# Patient Record
Sex: FEMALE | Race: OTHER | HISPANIC OR LATINO | URBAN - METROPOLITAN AREA
[De-identification: names, ages, dates, MRNs, and addresses within clinical notes are randomized per-mention and may not be internally consistent; named-entity substitution may affect disease eponyms.]

---

## 2023-10-07 ENCOUNTER — APPOINTMENT (OUTPATIENT)
Dept: LAB | Age: 43
End: 2023-10-07

## 2023-10-07 ENCOUNTER — OCCMED (OUTPATIENT)
Dept: URGENT CARE | Age: 43
End: 2023-10-07

## 2023-10-07 DIAGNOSIS — Z02.1 PHYSICAL EXAM, PRE-EMPLOYMENT: ICD-10-CM

## 2023-10-07 DIAGNOSIS — Z00.00 NORMAL EXAM: Primary | ICD-10-CM

## 2023-10-07 PROCEDURE — 86480 TB TEST CELL IMMUN MEASURE: CPT

## 2023-10-07 PROCEDURE — 36415 COLL VENOUS BLD VENIPUNCTURE: CPT

## 2023-10-07 NOTE — PROGRESS NOTES
This encounter was created for [facility] orders only . [facility]        NAME: Serena Camacho is a 39 y.o. female  : 1980    MRN: [id number]  DATE: 2023  TIME: 11:11 AM    There were no vitals taken for this visit. Assessment and Plan   No primary diagnosis found. No diagnosis found. Patient Instructions       Follow up with PCP in 3-5 days. Proceed to  ER if symptoms worsen. Chief Complaint   No chief complaint on file. History of Present Illness       HPI    Review of Systems   Review of Systems      Current Medications     No current outpatient medications on file. Current Allergies     Allergies as of 10/07/2023   • (Not on File)            The following portions of the patient's history were reviewed and updated as appropriate: allergies, current medications, past family history, past medical history, past social history, past surgical history and problem list.     No past medical history on file. No past surgical history on file. No family history on file. Medications have been verified. Objective   There were no vitals taken for this visit.        Physical Exam     Physical Exam

## 2023-10-08 LAB
GAMMA INTERFERON BACKGROUND BLD IA-ACNC: 0.06 IU/ML
M TB IFN-G BLD-IMP: NEGATIVE
M TB IFN-G CD4+ BCKGRND COR BLD-ACNC: 0.02 IU/ML
M TB IFN-G CD4+ BCKGRND COR BLD-ACNC: 0.05 IU/ML
MITOGEN IGNF BCKGRD COR BLD-ACNC: 9.94 IU/ML

## 2024-01-29 ENCOUNTER — OFFICE VISIT (OUTPATIENT)
Dept: OBGYN CLINIC | Facility: CLINIC | Age: 44
End: 2024-01-29

## 2024-01-29 VITALS
SYSTOLIC BLOOD PRESSURE: 115 MMHG | HEART RATE: 88 BPM | BODY MASS INDEX: 29.25 KG/M2 | DIASTOLIC BLOOD PRESSURE: 80 MMHG | WEIGHT: 182 LBS | HEIGHT: 66 IN

## 2024-01-29 DIAGNOSIS — Z01.419 WOMEN'S ANNUAL ROUTINE GYNECOLOGICAL EXAMINATION: Primary | ICD-10-CM

## 2024-01-29 DIAGNOSIS — Z12.4 CERVICAL CANCER SCREENING: ICD-10-CM

## 2024-01-29 DIAGNOSIS — Z12.31 ENCOUNTER FOR SCREENING MAMMOGRAM FOR MALIGNANT NEOPLASM OF BREAST: ICD-10-CM

## 2024-01-29 DIAGNOSIS — Z11.3 SCREENING FOR STDS (SEXUALLY TRANSMITTED DISEASES): ICD-10-CM

## 2024-01-29 PROCEDURE — 99386 PREV VISIT NEW AGE 40-64: CPT | Performed by: NURSE PRACTITIONER

## 2024-01-29 NOTE — PROGRESS NOTES
"ANNUAL GYNECOLOGICAL EXAMINATION    Talia Shaw is a 43 y.o. female who presents today for annual GYN exam.  Her last pap smear was performed a few years ago and result was normal per patient.  She reports no history of abnormal pap smears in her past.  Her last mammogram was performed a few years ago and result was normal per patient. She has not had a recent HIV screening performed. She reports menses as regular.  Patient's last menstrual period was 01/15/2024 (exact date).  Her general medical history has been reviewed and she reports it as follows:    History reviewed. No pertinent past medical history.  Past Surgical History:   Procedure Laterality Date     SECTION       SECTION       SECTION      TUBAL LIGATION       OB History          3    Para   3    Term   3            AB        Living   3         SAB        IAB        Ectopic        Multiple        Live Births                   Social History     Tobacco Use    Smoking status: Never    Smokeless tobacco: Never   Substance Use Topics    Alcohol use: Never    Drug use: Never     Social History     Substance and Sexual Activity   Sexual Activity Yes    Partners: Male    Birth control/protection: Female Sterilization, None     Cancer-related family history includes Prostate cancer in her father; Uterine cancer in her mother and paternal grandmother. There is no history of Breast cancer, Colon cancer, or Ovarian cancer.    No current outpatient medications    Review of Systems:  Review of Systems   Constitutional: Negative.    Gastrointestinal: Negative.    Genitourinary: Negative.    Skin: Negative.        Physical Exam:  /80 (BP Location: Right arm, Patient Position: Sitting)   Pulse 88   Ht 5' 6\" (1.676 m)   Wt 82.6 kg (182 lb)   LMP 01/15/2024 (Exact Date)   BMI 29.38 kg/m²   Physical Exam  Constitutional:       General: She is not in acute distress.     Appearance: Normal appearance. "   Genitourinary:      Vulva and bladder normal.      No lesions in the vagina.      No vaginal erythema or ulceration.        Right Adnexa: not tender and no mass present.     Left Adnexa: not tender and no mass present.     No cervical motion tenderness or lesion.      Uterus is not enlarged or tender.      No uterine mass detected.  Breasts:     Right: No mass, nipple discharge or skin change.      Left: No mass, nipple discharge or skin change.   Cardiovascular:      Rate and Rhythm: Normal rate and regular rhythm.   Pulmonary:      Effort: Pulmonary effort is normal.      Breath sounds: Normal breath sounds.   Abdominal:      General: Abdomen is flat.      Palpations: Abdomen is soft.   Musculoskeletal:      Cervical back: Neck supple.   Neurological:      Mental Status: She is alert.   Skin:     General: Skin is warm and dry.   Psychiatric:         Mood and Affect: Mood normal.         Behavior: Behavior normal.   Vitals reviewed.       Assessment/Plan:   1. Normal well-woman GYN exam.  2. Cervical cancer screening:  Normal cervical exam.  Pap smear done with HPV co-testing.    3. STD screening:  Orders placed for vaginal GC/CT cultures. Declines serum anti-HIV, anti-HCV, HbsAg, syphilis panel.   4. Breast cancer screening:  Normal breast exam.  Order placed for bilateral screening mammogram.  Reviewed breast self-awareness.   5. Depression Screening: Patient's depression screening was assessed with a PHQ-2 score of 0. Their PHQ-9 score was 0. Clinically patient does not have depression. No treatment is required.     6. BMI Counseling: Body mass index is 29.38 kg/m². Discussed the patient's BMI with her. The BMI is below 30 and does not require intervention    7. Contraception:  permanent sterilization    8. Return to office in one year for annual exam or sooner if needed.    Reviewed with patient that [facility], but that they will not necessarily be reviewed by me  immediately.  Explained that I will review results at my earliest opportunity and contact patient appropriately.

## 2024-01-30 PROCEDURE — 87591 N.GONORRHOEAE DNA AMP PROB: CPT | Performed by: NURSE PRACTITIONER

## 2024-01-30 PROCEDURE — 88175 CYTOPATH C/V AUTO FLUID REDO: CPT | Performed by: NURSE PRACTITIONER

## 2024-01-30 PROCEDURE — 87624 HPV HI-RISK TYP POOLED RSLT: CPT | Performed by: NURSE PRACTITIONER

## 2024-01-30 PROCEDURE — 87491 CHLMYD TRACH DNA AMP PROBE: CPT | Performed by: NURSE PRACTITIONER

## 2024-01-31 ENCOUNTER — TELEPHONE (OUTPATIENT)
Dept: OTHER | Facility: OTHER | Age: 44
End: 2024-01-31

## 2024-01-31 LAB
HPV HR 12 DNA CVX QL NAA+PROBE: NEGATIVE
HPV16 DNA CVX QL NAA+PROBE: NEGATIVE
HPV18 DNA CVX QL NAA+PROBE: NEGATIVE

## 2024-01-31 NOTE — TELEPHONE ENCOUNTER
Talia Shaw  [unfilled]  [unfilled]  [phone]  No e-mail address on record  Ivorian  Ivorian  No primary care provider on file.     Mammogram Screening (Hospital locations only):[facility]  Pap smear screening (Clinic vs [facility][1]):  PCP (Clinic vs [facility][1]):       Transportation:     Education:    Patient scheduled for mammogram and educated on the importance of early detection and routine screenings.

## 2024-02-01 LAB
C TRACH DNA SPEC QL NAA+PROBE: NEGATIVE
N GONORRHOEA DNA SPEC QL NAA+PROBE: NEGATIVE

## 2024-02-05 LAB
LAB AP GYN PRIMARY INTERPRETATION: NORMAL
Lab: NORMAL

## 2024-02-06 NOTE — RESULT ENCOUNTER NOTE
Spoke with patient to confirm normal pap and neg sti screening. Patient stated understanding and had no further questions

## 2024-05-14 ENCOUNTER — TELEPHONE (OUTPATIENT)
Facility: HOSPITAL | Age: 44
End: 2024-05-14

## 2024-05-14 NOTE — TELEPHONE ENCOUNTER
Telephone Call    [] Called Patient regarding [employer]; Patient answered call and wants to enroll; Asked for a call back.    [] Called Patient regarding [employer] [facility]; Patient answered call; Declined to enroll in program.    [x] Called Patient regarding [employer] [facility]; Patient did not ; Left voicemail with my name and direct phone number.     [] Called Patient regarding [employer] [facility]; Patient did not ; Unable to leave voicemail.    [] Called Patient regarding [employer] [facility]; Phone number is invalid    Attempts (Max 3): [x]1   []2   []3        Additional Notes:

## 2024-05-21 ENCOUNTER — TELEPHONE (OUTPATIENT)
Facility: HOSPITAL | Age: 44
End: 2024-05-21

## 2024-05-21 NOTE — TELEPHONE ENCOUNTER
Telephone Call    [] Called Patient regarding [employer] [facility]; Patient answered call and wants to enroll; Asked for a call back.    [] Called Patient regarding [employer] [facility]; Patient answered call; Declined to enroll in program.    [x] Called Patient regarding [employer] [facility]; Patient did not ; Left voicemail with my name and direct phone number.     [] Called Patient regarding [employer] [facility]; Patient did not ; Unable to leave voicemail.    [] Called Patient regarding [employer] [facility]; Phone number is invalid    Attempts (Max 3): []1   [x]2   []3        Additional Notes:

## 2024-05-29 ENCOUNTER — HOSPITAL ENCOUNTER (OUTPATIENT)
Dept: MAMMOGRAPHY | Facility: HOSPITAL | Age: 44
Discharge: HOME | End: 2024-05-29
Payer: COMMERCIAL

## 2024-05-29 VITALS — WEIGHT: 182.1 LBS | BODY MASS INDEX: 29.27 KG/M2 | HEIGHT: 66 IN

## 2024-05-29 DIAGNOSIS — Z12.31 ENCOUNTER FOR SCREENING MAMMOGRAM FOR MALIGNANT NEOPLASM OF BREAST: ICD-10-CM

## 2024-05-29 PROCEDURE — 77067 SCR MAMMO BI INCL CAD: CPT

## 2024-05-29 PROCEDURE — 77063 BREAST TOMOSYNTHESIS BI: CPT

## 2024-06-03 ENCOUNTER — TELEPHONE (OUTPATIENT)
Facility: HOSPITAL | Age: 44
End: 2024-06-03

## 2024-06-03 NOTE — TELEPHONE ENCOUNTER
Telephone Call    [] Called Patient regarding [employer] [facility]; Patient answered call and DECLINED to enroll; She stated that she cannot apply for any government assistant. She was contact by the hospital financial dept where they explained to her that she would be fully covered under their assistant.     [] Called Patient regarding [employer] [facility]; Patient answered call; Declined to enroll in program.    [] Called Patient regarding [employer] [facility]; Patient did not ; Left voicemail with my name and direct phone number.     [] Called Patient regarding [employer] [facility]; Patient did not ; Unable to leave voicemail.    [] Called Patient regarding [employer] [facility]; Phone number is invalid    Attempts (Max 3): []1   []2   []3        Additional Notes:

## 2024-08-30 ENCOUNTER — OFFICE VISIT (OUTPATIENT)
Dept: OBGYN CLINIC | Facility: CLINIC | Age: 44
End: 2024-08-30

## 2024-08-30 VITALS
WEIGHT: 183 LBS | HEART RATE: 87 BPM | HEIGHT: 66 IN | DIASTOLIC BLOOD PRESSURE: 65 MMHG | SYSTOLIC BLOOD PRESSURE: 105 MMHG | BODY MASS INDEX: 29.41 KG/M2

## 2024-08-30 DIAGNOSIS — R04.0 EPISTAXIS: ICD-10-CM

## 2024-08-30 DIAGNOSIS — Z11.3 SCREEN FOR STD (SEXUALLY TRANSMITTED DISEASE): ICD-10-CM

## 2024-08-30 DIAGNOSIS — N93.9 ABNORMAL VAGINAL BLEEDING: Primary | ICD-10-CM

## 2024-08-30 LAB — SL AMB POCT URINE HCG: NEGATIVE

## 2024-08-30 PROCEDURE — 99213 OFFICE O/P EST LOW 20 MIN: CPT | Performed by: OBSTETRICS & GYNECOLOGY

## 2024-08-30 PROCEDURE — 87591 N.GONORRHOEAE DNA AMP PROB: CPT | Performed by: OBSTETRICS & GYNECOLOGY

## 2024-08-30 PROCEDURE — 87491 CHLMYD TRACH DNA AMP PROBE: CPT | Performed by: OBSTETRICS & GYNECOLOGY

## 2024-08-30 PROCEDURE — 81025 URINE PREGNANCY TEST: CPT | Performed by: OBSTETRICS & GYNECOLOGY

## 2024-08-30 NOTE — ASSESSMENT & PLAN NOTE
Patient endorses abnormal vaginal bleeding. She had 5 days of heavy bleeding 2 weeks apart. Currently complaining of spotting. Patient usually has regular monthly periods. Patient has red-brown discharge on speculum examination from cervical os. Risk factors for endometrial cancer include obesity. Differential diagnosis includes uterine fibroids, endometrial carcinoma vs perimenopausal changes.    Plan:  Urine pregnancy test in office today, negative  CBC, PT, PTT, INR ordered  Pelvic ultrasound ordered  Advised patient to keep menstrual diary  Chlamydia and gonorrhea collected today per patient request  Follow up in 3 months  Patient may require endometrial biopsy if no cause of abnormal bleeding identified

## 2024-08-30 NOTE — PROGRESS NOTES
Ambulatory Visit  Name: Talia Shaw      : 1980      MRN: [id number]  Encounter Provider: [facility]  Encounter Date: 2024   Encounter department: [facility] [place]    Assessment & Plan   1. Abnormal vaginal bleeding  Assessment & Plan:  Patient endorses abnormal vaginal bleeding. She had 5 days of heavy bleeding 2 weeks apart. Currently complaining of spotting. Patient usually has regular monthly periods. Patient has red-brown discharge on speculum examination from cervical os. Risk factors for endometrial cancer include obesity. Differential diagnosis includes uterine fibroids, endometrial carcinoma vs perimenopausal changes.    Plan:  Urine pregnancy test in office today  CBC, PT, PTT, INR ordered  Pelvic ultrasound ordered  Advised patient to keep menstrual diary  Chlamydia and gonorrhea collected today per patient request  Follow up in 3 months  Patient may require endometrial biopsy if no cause of abnormal bleeding identified  Orders:  -     CBC and Platelet; Future  -     Protime-INR; Future  -     APTT; Future  -     US pelvis complete non OB; Future; Expected date: 2024  -     Chlamydia/GC amplified DNA by PCR  -     POCT urine HCG  2. Epistaxis  -     CBC and Platelet; Future  -     Protime-INR; Future  -     APTT; Future  3. Screen for STD (sexually transmitted disease)  -     Chlamydia/GC amplified DNA by PCR      History of Present Illness     Talia Shaw is a 43 y.o. female who presents for irregular menses. Patient had a 5 day heavy period starting 8/3 and another 5 day heavy period two weeks later starting . This was very unusual for her. She also notes she had a nose bleeds during her second period this month. Also notes accompanying nausea and headaches. Patient is currently sexually active with 1 male partner. Denies any clotting or bleeding disorders. Endorses continued vaginal bleeding throughout pregnancies. Denies family  "history autoimmune diseases or hemophilia. Has a cousin with a blood clot in leg.   Patient reports she breast fed all 3 of her children, denies history of unopposed estrogen therapy. She repots she has always been overweight, usually between 160-180 lbs in her adult life.     Review of Systems   Constitutional:  Negative for chills and fever.   HENT:  Negative for ear pain and sore throat.    Eyes:  Negative for pain and visual disturbance.   Respiratory:  Negative for cough and shortness of breath.    Cardiovascular:  Negative for chest pain and palpitations.   Gastrointestinal:  Negative for abdominal pain and vomiting.   Genitourinary:  Positive for menstrual problem and vaginal bleeding. Negative for dysuria and hematuria.   Musculoskeletal:  Negative for arthralgias and back pain.   Skin:  Negative for color change and rash.   Neurological:  Negative for seizures and syncope.   All other systems reviewed and are negative.      Objective     /65 (BP Location: Right arm, Patient Position: Sitting)   Pulse 87   Ht 5' 6\" (1.676 m)   Wt 83 kg (183 lb)   LMP 08/19/2024 (Exact Date)   BMI 29.54 kg/m²     Physical Exam  Constitutional:       Appearance: Normal appearance.   Cardiovascular:      Rate and Rhythm: Normal rate and regular rhythm.      Pulses: Normal pulses.      Heart sounds: Normal heart sounds.   Pulmonary:      Effort: Pulmonary effort is normal.      Breath sounds: Normal breath sounds. No wheezing, rhonchi or rales.   Abdominal:      General: Abdomen is flat.      Palpations: Abdomen is soft.   Genitourinary:     Cervix: Cervical bleeding present. No lesion.      Uterus: Normal.       Comments: Red-black discharge from cervical os  Neurological:      Mental Status: She is alert.     Administrative Statements       Kayla Garland M.D.  [facility] PGY2  8/30/2024, 8:57 AM      "

## 2024-09-03 LAB
C TRACH DNA SPEC QL NAA+PROBE: NEGATIVE
N GONORRHOEA DNA SPEC QL NAA+PROBE: NEGATIVE

## 2024-09-13 ENCOUNTER — HOSPITAL ENCOUNTER (OUTPATIENT)
Dept: RADIOLOGY | Facility: HOSPITAL | Age: 44
Discharge: HOME | End: 2024-09-13

## 2024-09-13 DIAGNOSIS — N93.9 ABNORMAL VAGINAL BLEEDING: ICD-10-CM

## 2024-09-13 PROCEDURE — 76830 TRANSVAGINAL US NON-OB: CPT

## 2024-09-13 PROCEDURE — 76856 US EXAM PELVIC COMPLETE: CPT

## 2024-09-20 ENCOUNTER — TELEPHONE (OUTPATIENT)
Dept: OBGYN CLINIC | Facility: CLINIC | Age: 44
End: 2024-09-20

## 2024-09-20 NOTE — TELEPHONE ENCOUNTER
Called patient to discuss ultrasound results. Informed patient of presence of uterine fibroids and ovarian endometrioma, all likely benign findings. All questions and concerns addressed. As patient is 44 female with abnormal uterine bleeding would recommend endometrial biopsy. Discussed possibility of upcoming appointment 11/29 being converted to endometrial biopsy, patient amenable. Will consider ordering repeat ultrasound after endometrial biopsy.    Kayla Garland M.D.  [facility] PGY2  9/20/2024, 8:35 AM

## 2024-10-30 NOTE — PROGRESS NOTES
"OB/GYN VISIT  Talia Shaw  10/31/2024  4:26 PM    ASSESSMENT / PLAN:    Talia Shaw is a 44 y.o.  female presenting for EMB.    Abnormal uterine bleeding  Consented for EMB - discussed risk of bleeding, infection uterine perforation   EMB performed, see procedure note for details    SUBJECTIVE:    Talia Shaw is a 44 y.o.  female presenting for EMB. presenting for EMB. She was recently evaluated on  for problem visit to discuss abnormal uterine bleeding. At that time, she reported twp episodes of 5 days of heavy bleeding 2 weeks apart; periods normally regular. She also described new nosebleeds. GC/CT and POC hCG negative. TVUS with fibroid and endometrioma. She was advised to return for endometrial sampling to r/o pathology due to age (>44 y.o). She presents today for EMB. She reports no changes to her health since her last appointment.     History reviewed. No pertinent past medical history.    Past Surgical History:   Procedure Laterality Date     SECTION       SECTION       SECTION      TUBAL LIGATION         OBJECTIVE:    Vitals:  Blood pressure 112/59, pulse 89, height 5' 6\" (1.676 m), weight 85.7 kg (189 lb), last menstrual period 10/11/2024.Body mass index is 30.51 kg/m².    Physical Exam:    Physical Exam  Constitutional:       Appearance: Normal appearance.   Genitourinary:      Vulva normal.      Cervix is not nulliparous.      No cervical discharge or polyp.   Pulmonary:      Effort: Pulmonary effort is normal.   Musculoskeletal:         General: Normal range of motion.   Neurological:      General: No focal deficit present.      Mental Status: She is alert and oriented to person, place, and time.   Skin:     General: Skin is warm and dry.       Shavonne Thrasher MD  10/31/2024  4:26 PM        "

## 2024-10-31 ENCOUNTER — PROCEDURE VISIT (OUTPATIENT)
Dept: OBGYN CLINIC | Facility: CLINIC | Age: 44
End: 2024-10-31

## 2024-10-31 VITALS
HEART RATE: 89 BPM | BODY MASS INDEX: 30.37 KG/M2 | WEIGHT: 189 LBS | HEIGHT: 66 IN | DIASTOLIC BLOOD PRESSURE: 59 MMHG | SYSTOLIC BLOOD PRESSURE: 112 MMHG

## 2024-10-31 DIAGNOSIS — N93.9 ABNORMAL UTERINE BLEEDING (AUB): Primary | ICD-10-CM

## 2024-10-31 PROCEDURE — 88305 TISSUE EXAM BY PATHOLOGIST: CPT | Performed by: PATHOLOGY

## 2024-10-31 PROCEDURE — 58100 BIOPSY OF UTERUS LINING: CPT | Performed by: OBSTETRICS & GYNECOLOGY

## 2024-10-31 NOTE — PROGRESS NOTES
Endometrial biopsy    Date/Time: 10/31/2024 4:00 PM    Performed by: Shavonne Thrasher MD  Authorized by: Shavonne Thrasher MD  Universal Protocol:  Procedure performed by: (Dr. Perez)  Consent: Verbal consent obtained. Written consent obtained.  Patient understanding: patient states understanding of the procedure being performed  Patient consent: the patient's understanding of the procedure matches consent given  Procedure consent: procedure consent matches procedure scheduled  Relevant documents: relevant documents present and verified  Radiology Images displayed and confirmed. If images not available, report reviewed: imaging studies available  Patient identity confirmed: verbally with patient    Indication:     Indications: Other disorder of menstruation and other abnormal bleeding from female genital tract    Procedure:     Procedure: endometrial biopsy with Pipelle      A bivalve speculum was placed in the vagina: yes      Cervix cleaned and prepped: yes      A paracervical block was performed: no      An intracervical block was performed: no      Uterus sound depth (cm):  9    Specimen collected: specimen collected and sent to pathology      Patient tolerated procedure well with no complications: yes    Findings:     Uterus size:  Non-gravid  Comments:     Procedure comments:  Dr. Perez was present for entire procedure.    Shavonne Thrasher MD  PGY-2 OBGYN

## 2024-11-06 PROCEDURE — 88305 TISSUE EXAM BY PATHOLOGIST: CPT | Performed by: PATHOLOGY

## 2024-11-07 ENCOUNTER — TELEPHONE (OUTPATIENT)
Dept: OTHER | Facility: HOSPITAL | Age: 44
End: 2024-11-07

## 2024-11-07 NOTE — TELEPHONE ENCOUNTER
Called patient to discuss EMB results. Discussed that there is no evidence of hyperplasia or cancer, but biopsy did show possible endometrial polyp. She reports that she spotted for a few days after the biopsy and then the bleeding picked up a bit, but she is not soaking pads and feels well. We discussed that treatment options include a procedure to remove the polyp, but that she should return to the office to discuss all options, especially as she has continued to have abnormal bleeding. Patient expressed understanding. Will send message to office to set up appointment for discussion of options and possible surgical consent.    [facility] [id number]    Shavonne Thrasher MD  PGY-2 OBGYN

## 2024-11-11 ENCOUNTER — TELEPHONE (OUTPATIENT)
Dept: OBGYN CLINIC | Facility: CLINIC | Age: 44
End: 2024-11-11

## 2024-11-11 NOTE — TELEPHONE ENCOUNTER
Called pt and informed of results, apt nevaeh for 11/19/24 with Dr. Kohler.    ----- Message from Shavonne Thrasher MD sent at 11/7/2024  2:55 PM EST -----  Regarding: Schedule f/u appointment  Hi,     Please reach out to this patient to schedule a follow-up appointment for endometrial biopsy results and possible surgical consent. I spoke with her on the phone to let her know that results were benign but showed possible polyp. She should come back to the office to discuss possible hysteroscopy, D&C, polypectomy vs other options.     Thanks,   Shavonne Thrasher MD  PGY-2 OBGYN

## 2025-04-28 ENCOUNTER — APPOINTMENT (OUTPATIENT)
Dept: LAB | Facility: CLINIC | Age: 45
End: 2025-04-28

## 2025-04-28 ENCOUNTER — HOSPITAL ENCOUNTER (OUTPATIENT)
Dept: RADIOLOGY | Facility: HOSPITAL | Age: 45
Discharge: HOME | End: 2025-04-28

## 2025-04-28 ENCOUNTER — OFFICE VISIT (OUTPATIENT)
Dept: OBGYN CLINIC | Facility: CLINIC | Age: 45
End: 2025-04-28

## 2025-04-28 VITALS
BODY MASS INDEX: 28.61 KG/M2 | HEIGHT: 66 IN | DIASTOLIC BLOOD PRESSURE: 58 MMHG | WEIGHT: 178 LBS | HEART RATE: 87 BPM | SYSTOLIC BLOOD PRESSURE: 110 MMHG

## 2025-04-28 DIAGNOSIS — N93.9 ABNORMAL UTERINE BLEEDING (AUB): Primary | ICD-10-CM

## 2025-04-28 DIAGNOSIS — R10.2 PELVIC PAIN: ICD-10-CM

## 2025-04-28 DIAGNOSIS — N93.9 ABNORMAL UTERINE BLEEDING (AUB): ICD-10-CM

## 2025-04-28 DIAGNOSIS — Z12.11 COLON CANCER SCREENING: ICD-10-CM

## 2025-04-28 LAB
SL AMB  POCT GLUCOSE, UA: NORMAL
SL AMB LEUKOCYTE ESTERASE,UA: NORMAL
SL AMB POCT BILIRUBIN,UA: NORMAL
SL AMB POCT BLOOD,UA: NORMAL
SL AMB POCT CLARITY,UA: NORMAL
SL AMB POCT COLOR,UA: NORMAL
SL AMB POCT KETONES,UA: NORMAL
SL AMB POCT NITRITE,UA: NORMAL
SL AMB POCT PH,UA: 6
SL AMB POCT SPECIFIC GRAVITY,UA: 1.01
SL AMB POCT URINE HCG: NEGATIVE
SL AMB POCT URINE PROTEIN: NORMAL
SL AMB POCT UROBILINOGEN: 0.2
TSH SERPL DL<=0.05 MIU/L-ACNC: 1.24 UIU/ML (ref 0.45–4.5)

## 2025-04-28 PROCEDURE — 84443 ASSAY THYROID STIM HORMONE: CPT

## 2025-04-28 PROCEDURE — 36415 COLL VENOUS BLD VENIPUNCTURE: CPT

## 2025-04-28 PROCEDURE — 76830 TRANSVAGINAL US NON-OB: CPT

## 2025-04-28 PROCEDURE — 76856 US EXAM PELVIC COMPLETE: CPT

## 2025-04-28 RX ORDER — COVID-19 ANTIGEN TEST
220 KIT MISCELLANEOUS
COMMUNITY

## 2025-04-28 NOTE — PROGRESS NOTES
"OB/GYN VISIT  Talia Shaw  2025  9:49 AM    ASSESSMENT / PLAN:    Talia Shaw is a 44 y.o.  female presenting for counseling due to AUB. Today she reports LLQ pain over the weekend.    Plan:  -Repeat US today, scheduled for 3 pm  -CBC, TSH ordered  -Colonoscopy ordered as pt is 45 yo and has never had one  -Patient was counseled regarding exam under anesthesia, hysteroscopy, D&C, polypectomy, and all other indicated procedures due to AUB. We reviewed the risks, benefits, and alternatives. Informed consent was obtained. Preoperative instructions were reviewed. Postoperative instructions and expectations were reviewed. She is aware of the scheduling process and will await call.   -Advised patient to report to the ED should pain worsen, bleeding worsen, or if she develops fevers/chills/vomiting. She was agreeable.     SUBJECTIVE:    Talia Shaw is a 44 y.o.  female presenting for f/u in the setting of AUB. Patient was seen by Dr. Thrasher previously on 10/31 with complaints of two episodes of heavy bleeding lasting 5 days, two weeks apart. GC/CT and UPT were negative. US demonstrated a possible endometrioma and fibroid. EMB was negative for hyperplasia/malignancy but showed polyp fragments.     She experienced a normal period that ended last Monday. This past Saturday () she started bleeding again. She also experienced very intense LLQ pain that forced her to leave work. She took Naproxen and Buscapina (acetaminophen/caffeine) which didn't really help. Pain was rated at \"1000\" over the weekend. Was worsened by walking and alleviated by herbal tea. Today she rates it an 8. She reports it does not feel like period cramps. She reports she had [place] \"[place]\" [place] and she attribute this pain to it. She struggles with constipation. Her last BM was Saturday, was hard and pellet-like. She denies urinary symptoms. Denies fever, nausea, vomiting, lightheadedness, dizziness. " "    History reviewed. No pertinent past medical history.    Past Surgical History:   Procedure Laterality Date     SECTION       SECTION       SECTION      TUBAL LIGATION         OBJECTIVE:    Vitals:  Blood pressure 110/58, pulse 87, height 5' 6\" (1.676 m), weight 80.7 kg (178 lb), last menstrual period 2025.Body mass index is 28.73 kg/m².    Physical Exam:    Physical Exam  Constitutional:       Appearance: Normal appearance.   Genitourinary:      Rectum normal.      No lesions in the vagina.      Right Labia: No tenderness or lesions.     Left Labia: No tenderness or lesions.     No vaginal discharge or tenderness.      No vaginal prolapse present.     Mild vaginal atrophy present.       Right Adnexa: not tender and no mass present.     Left Adnexa: tender.     Left Adnexa: no mass present.     Cervix is not nulliparous.      No cervical discharge, lesion or polyp.      Uterus is not enlarged, fixed, tender or irregular.      No uterine mass detected.  HENT:      Head: Atraumatic.   Eyes:      Extraocular Movements: Extraocular movements intact.      Conjunctiva/sclera: Conjunctivae normal.   Cardiovascular:      Rate and Rhythm: Normal rate and regular rhythm.      Pulses: Normal pulses.   Pulmonary:      Effort: Pulmonary effort is normal. No respiratory distress.      Breath sounds: Normal breath sounds.   Abdominal:      General: There is no distension.      Palpations: Abdomen is soft. There is no mass.      Tenderness: There is abdominal tenderness. There is no guarding or rebound.   Neurological:      General: No focal deficit present.      Mental Status: She is alert and oriented to person, place, and time.   Skin:     General: Skin is warm and dry.   Psychiatric:         Mood and Affect: Mood normal.         Behavior: Behavior normal.   Vitals reviewed. Exam conducted with a chaperone present.       Sonja Pillai MD  2025  9:49 AM       "

## 2025-04-28 NOTE — LETTER
April 28, 2025     Patient: Talia Shaw  YOB: 1980  Date of Visit: 4/28/2025      To Whom it May Concern:    Talia Shaw is under my professional care. Talia was seen in my office on 4/28/2025. Talia may return to work on 04/30/2025 .    If you have any questions or concerns, please don't hesitate to call.        Sincerely,      Sonja Pillai MD

## 2025-05-01 ENCOUNTER — TELEPHONE (OUTPATIENT)
Dept: OTHER | Facility: HOSPITAL | Age: 45
End: 2025-05-01

## 2025-05-01 NOTE — TELEPHONE ENCOUNTER
[facility] Case Review  Date Reviewed: 05/01/25   Reviewed by: Anna Marie Leal  Case request: D&C, hysteroscopy, possible polypectomy  Indication: AUB, possible polyp  Surgical Consent: 4/28/25 MA30/31: N/A    Case request approved: yes    Comments:  Must discuss IUD placement at time of surgery during H&P visit    Shavonne Thrasher MD  OBGYN PGY2  05/01/25

## 2025-05-09 ENCOUNTER — OFFICE VISIT (OUTPATIENT)
Dept: INTERNAL MEDICINE CLINIC | Facility: CLINIC | Age: 45
End: 2025-05-09

## 2025-05-09 ENCOUNTER — TELEPHONE (OUTPATIENT)
Dept: OBGYN CLINIC | Facility: CLINIC | Age: 45
End: 2025-05-09

## 2025-05-09 VITALS
DIASTOLIC BLOOD PRESSURE: 71 MMHG | TEMPERATURE: 98 F | HEART RATE: 101 BPM | SYSTOLIC BLOOD PRESSURE: 113 MMHG | WEIGHT: 175 LBS | HEIGHT: 67 IN | BODY MASS INDEX: 27.47 KG/M2

## 2025-05-09 DIAGNOSIS — Z12.12 SCREENING FOR COLORECTAL CANCER: ICD-10-CM

## 2025-05-09 DIAGNOSIS — Z13.9 SCREENING DUE: ICD-10-CM

## 2025-05-09 DIAGNOSIS — M25.522 ELBOW PAIN, CHRONIC, LEFT: ICD-10-CM

## 2025-05-09 DIAGNOSIS — G89.29 ELBOW PAIN, CHRONIC, LEFT: ICD-10-CM

## 2025-05-09 DIAGNOSIS — Z00.00 ANNUAL PHYSICAL EXAM: Primary | ICD-10-CM

## 2025-05-09 DIAGNOSIS — Z12.11 SCREENING FOR COLORECTAL CANCER: ICD-10-CM

## 2025-05-09 DIAGNOSIS — R10.32 LEFT LOWER QUADRANT ABDOMINAL PAIN: ICD-10-CM

## 2025-05-09 RX ORDER — POLYETHYLENE GLYCOL 3350 17 G/17G
17 POWDER, FOR SOLUTION ORAL DAILY
Qty: 510 G | Refills: 1 | Status: SHIPPED | OUTPATIENT
Start: 2025-05-09

## 2025-05-09 NOTE — TELEPHONE ENCOUNTER
Called pt to discuss scheduling surgery using [employer] # [id number] ([place])     Pt states she does not want to schedule the surgery until she figures out how much the surgery will cost since she does not have any insurance.   Gave pt phone number to   for pt to check.   Pt asked which OR dates are currently available so she can also try to coordinate with her job and transportation if the surgery is affordable for her. Informed pt that currently we can do Tues. 6/10 @ [facility], Tues. 6/17 @ [facility], Mon. 06/23 @ [facility] or Tues. 06/24 @ [facility].   Pt wants to check with her job, transportation and   and if wanting to proceed with surgery will then call [facility] to proceed with surgery scheduling.

## 2025-05-09 NOTE — PROGRESS NOTES
Adult Annual Physical  Name: Talia Shaw      : 1980      MRN: [id number]  Encounter Provider: Gabriel Pace MD  Encounter Date: 2025   Encounter department: [facility] [place]    :  Assessment & Plan  Screening due    Orders:    CBC and differential; Future    Comprehensive metabolic panel; Future    Lipid panel; Future    Hemoglobin A1C; Future    Ambulatory Referral to Dentistry; Future    Mammo screening bilateral w 3d and cad; Future    Annual physical exam  Infectious Health Screenings  - Previous HIV Screening: No  - Previous HCV Screening: No  - Previous STI Screening: Negative    Cardiovascular Screenings  - Lipid Screening Indicated: Yes   - DM Screening Indicated: Yes   - AAA Screening Indicated: No    Cancer Screenings  - Colon Cancer Screening: Cologuard  - Breast Cancer Screening: Due this year; Orders placed  - Lung Cancer Screening: Not indicated  - Pap Smear Screening: Up-to-date; Due in 2029    Other Screenings  - Depression: Negative for depression with PHQ2 score of 0.  - Home Safety and Domestic Violence Screen: Patient was asked about physical abuse by someone important to them: No, Patient was asked about verbal abuse or controlling by someone important to them: No  - Substance Abuse: no substance abuse  - Sexual Activity: Yes - single partner - male  - Diet and Exercise: Diet Hx Reviewed with Pt and Weight Loss/Gain Discusses     Plan  Routine Screening Labs ordered as above  Due for annual physical within 1 year        Screening for colorectal cancer    Orders:    Cologalec    BMI 27.0-27.9,adult  Discussed lifestyle management with diet and exercise, which she is amenable to with dietary changes at this time with low-carbohydrate diet       Left lower quadrant abdominal pain  At this time patient presenting with chronic history of intermittent left lower quadrant pain, associated with constipation with exacerbation using foods with gluten  and carbohydrate content    Suspect this presentation is consistent with IBS-constipation type given recurrent pain with notable food association and improvement in pain on defecation with stool becoming harder on defecation, meeting ALON-IV Criteria    Differential also includes celiac's based on food content, but lower suspicion given no clear steatorhea or unintentional weight loss, as well as thyroid dysfunction, lactose intolerance or referred pain from previously seen left endometrioma on pelvic US    Plan  Will treat constipation empirically with Miralax PRN  Will obtain thyroid function assay to rule out endocrinology etiology  Patient advised to continue lifestyle management with herbal tea and low-carbohydrate diet which have helped symptoms  Education of FODMAP diet provided  Orders:    polyethylene glycol (MIRALAX) 17 g packet; Take 17 g by mouth daily    TSH + Free T4; Future    Elbow pain, chronic, left  Patient's left elbow pain is consistent with lateral epicondylitis versus tendinitis given location, inciting event following lifting and pain on extension    Lower concern of Carpal Tunnel given no median nerve distribution of symptoms with negative Terrance and Phalen test on examination, though differential also includes radial tunnel syndrome, interosseous nerve entrapment or epicondylar fracture    Previous OTC medications without much relief so will trial more aggressive therapy    Plan  Tylenol PRN with Voltaren Gel daily  Hot and Cold Compresses with PT referral  If lack of improvement, will trial countertraction bracing with radiograph imaging  Orders:    Diclofenac Sodium (VOLTAREN) 1 %; Apply 2 g topically 4 (four) times a day    Ambulatory Referral to Physical Therapy; Future    Hot/Cold Therapy Aids ([employer] Hot/Cold Compress) PADS; Use daily as needed (Elbow Pain)        Preventive Screenings:  - Diabetes Screening: orders placed  - Cholesterol Screening: orders placed   - Hepatitis C screening:  "orders placed and patient agrees to screening   - HIV screening: orders placed and patient agrees to screening   - Cervical cancer screening: screening up-to-date   - Breast cancer screening: screening up-to-date   - Colon cancer screening: orders placed   - Lung cancer screening: screening not indicated     Immunizations:  - Immunizations due: Tdap    Counseling/Anticipatory Guidance:  - Alcohol: discussed moderation in alcohol intake and recommendations for healthy alcohol use.   - Drug use: discussed harms of illicit drug use and how it can negatively impact mental/physical health.   - Tobacco use: discussed harms of tobacco use and management options for quitting.   - Dental health: discussed importance of regular tooth brushing, flossing, and dental visits.   - Sexual health: discussed sexually transmitted diseases, partner selection, use of condoms, avoidance of unintended pregnancy, and contraceptive alternatives.   - Diet: discussed recommendations for a healthy/well-balanced diet.   - Exercise: the importance of regular exercise/physical activity was discussed. Recommend exercise 3-5 times per week for at least 30 minutes.   - Injury prevention: discussed safety/seat belts, safety helmets, smoke detectors, carbon monoxide detectors, and smoking near bedding or upholstery.          History of Present Illness     Adult Annual Physical:  Patient presents for annual physical. 44-year-old female presenting to [facility] care following moving to [facility] one and a half year ago from [facility]. She reports chronic history of 2-months of right elbow pain that started after lifting a heavy box at work along the lateral aspect described as a \"burning sensation\" that is severe and has tried medication in past with OTC IcyHot ointment and naproxen without relief. The pain is worsened with any movements including extension, flexion, supination and pronation. She denies any numbness, tingling, additional joint pain, " weakness or numbness.    Additionally reports several year history of left lower quadrant abdominal pain described as an intermittent dull sensation with bloating that has worsened with intake of high content carbohydrate foods including pizza, donuts, pasta and bread products.  She reports this pain has been present for several years and has been associated with onset of constipation, reports last episode was approximately 1 week ago which was followed by 3-day history of constipation, following which the abdominal pain had improved upon defecation though she does report stool character has changed on defecation when this pain has occurred.  Denies any steatorrhea, unintentional weight loss, hematochezia, melena or fever/chills..     Diet and Physical Activity:  - Diet/Nutrition: well balanced diet and low carb diet.  - Exercise: no formal exercise.    Depression Screening:  - PHQ-2 Score: 0    General Health:  - Sleep: sleeps well.  - Hearing: normal hearing bilateral ears.  - Vision: no vision problems.  - Dental: no dental visits for > 1 year and brushes teeth twice daily.    /GYN Health:  - Follows with GYN: yes.   - Menopause: premenopausal.   - Last menstrual cycle: 4/15/2025.   - History of STDs: no    Review of Systems   Constitutional:  Negative for chills and fever.   HENT:  Negative for ear pain and sore throat.    Eyes:  Negative for pain and visual disturbance.   Respiratory:  Negative for cough and shortness of breath.    Cardiovascular:  Negative for chest pain and palpitations.   Gastrointestinal:  Positive for abdominal pain and constipation. Negative for vomiting.   Genitourinary:  Negative for dysuria and hematuria.   Musculoskeletal:  Negative for arthralgias, back pain and joint swelling.   Skin:  Negative for color change and rash.   Neurological:  Negative for seizures and syncope.   All other systems reviewed and are negative.        Objective   /71 (BP Location: Left arm, Patient  "Position: Sitting, Cuff Size: Large)   Pulse 101   Temp 98 °F (36.7 °C) (Temporal)   Ht 5' 7\" (1.702 m)   Wt 79.4 kg (175 lb)   LMP 04/19/2025   BMI 27.41 kg/m²     Physical Exam  Vitals and nursing note reviewed.   Constitutional:       General: She is not in acute distress.     Appearance: She is well-developed.   HENT:      Head: Normocephalic and atraumatic.   Eyes:      Conjunctiva/sclera: Conjunctivae normal.   Cardiovascular:      Rate and Rhythm: Normal rate and regular rhythm.      Heart sounds: No murmur heard.  Pulmonary:      Effort: Pulmonary effort is normal. No respiratory distress.      Breath sounds: Normal breath sounds.   Abdominal:      Palpations: Abdomen is soft.      Tenderness: There is abdominal tenderness (Mild tenderness to palpation along the left lower quadrant on deep palpation).   Musculoskeletal:         General: No swelling.      Cervical back: Neck supple.      Right lower leg: No edema.      Left lower leg: No edema.      Comments: Pain on active range of motion with right elbow on extension across the lateral surface, mild tenderness on pronation  Negative Tinel, Phalen  No snuffbox tenderness  No obvious signs of acute erythema or edema of the elbow   Skin:     General: Skin is warm and dry.      Capillary Refill: Capillary refill takes less than 2 seconds.   Neurological:      Mental Status: She is alert.   Psychiatric:         Mood and Affect: Mood normal.         "

## 2025-05-09 NOTE — PATIENT INSTRUCTIONS
"Patient Education     Examen físico de rutina para adultos   Conceptos Básicos   Redactado por los médicos y editores de [employer]   ¿Qué es un examen físico? -- Un examen físico es mel consulta de rutina o \"revisión\" con sellers médico. También se conoce zachariah \"consulta de bienestar\" o \"consulta preventiva\".  Josephine cada consulta, el médico hará lo siguiente:   Preguntará por sellers usman física y mental   Preguntará sobre maximus hábitos, conductas y estilo de chiquis   Le hará un examen   Le administrará las vacunas que kaylie necesarias   Hablará con usted sobre cualquier medicina que tome   Le dará consejos sobre sellers usman   Responderá maximus preguntas  Hacerse revisiones periódicas es mel parte importante del cuidado de sellers usman. Puede ayudar a sellers médico a encontrar y tratar cualquier problema que tenga. Martha también es importante para prevenir problemas de usman.  Un examen físico de rutina es diferente de mel \"consulta por enfermedad\". Mel consulta por enfermedad es cuando lo atiende un médico debido a un problema o inquietud de usman. Dado que los exámenes físicos se programan con anticipación, usted puede pensar en lo que quiere preguntarle al médico.  ¿Con qué frecuencia amy hacerme un examen físico? -- Depende de sellers edad y de sellers estado de usman. En general, en el anabel de las personas mayores de 21 años:   Si tiene menos de 50 años, es posible que pueda hacerse un examen físico cada 3 años.   Si tiene 50 años o más, sellers médico podría recomendarle un examen físico cada año.  Si tiene un padecimiento de usman crónico, cassidy zachariah diabetes o presión arterial satnam, sellers médico probablemente querrá verlo con más frecuencia.  ¿Qué sucede josephine un examen físico? -- En general, cada consulta incluirá:   Examen físico - El médico o enfermero revisará sellers estatura, peso, frecuencia cardíaca y presión arterial. También le examinará los ojos y los oídos. Le preguntará cómo se siente y si tiene algún síntoma que le moleste.   Medicinas - Es mel " "buena idea llevar mel lista de todos las medicinas que molly cada vez que acude a la consulta médica. Sellers médico le hablará sobre griselda medicinas y responderá a griselda preguntas. Dígale si tiene algún efecto secundario que le moleste. También debe informarle si tiene dificultades para pagar alguna de griselda medicinas.   Hábitos y comportamientos - [place] incluye:   Sellers dieta   Griselda hábitos de ejercicio   Si fuma, niurka alcohol o consume drogas   Si es sexualmente activo   Si se siente seguro en casa  Sellers médico hablará con usted sobre las cosas que puede hacer para mejorar sellers usman y reducir el riesgo de tener problemas de usman. También ofrecerá ayuda y apoyo. Por ejemplo, si quiere dejar de fumar, puede darle consejos y recetarle medicinas. Si quiere mejorar sellers alimentación o realizar más actividad física, sellers médico también puede ayudarlo a lograr estos objetivos.   Pruebas de laboratorio, si son necesarias - Las pruebas que le realicen dependerán de sellers edad y situación. Por ejemplo, es posible que sellers médico quiera revisar sellers:   Colesterol   Azúcar en johnie   Nivel de juan   Vacunas - Las vacunas recomendadas dependerán de sellers edad, sellers usman y de las vacunas que ya haya recibido. Las vacunas son muy importantes porque pueden prevenir ciertas infecciones graves o mortales.   Análisis sobre las pruebas de detección - \"Detección\" significa revisar si hay enfermedades u otros problemas de usman antes de que causen síntomas. Sellers médico puede recomendar pruebas de detección según sellers edad, riesgo y preferencias. [place] podría incluir pruebas para detectar:   Cáncer, zachariah cáncer de seno, próstata, senia uterino, ovario, colorrectal, próstata, pulmón o piel   Infecciones de transmisión sexual tales zachariah clamidia y gonorrea   Padecimientos de usman mental tales zachariah depresión y ansiedad.  El médico le hablará sobre los diferentes tipos de pruebas de detección. Puede ayudarlo a decidir qué pruebas de detección debe hacerse. También le puede " explicar lo que podrían significar los resultados.   Responder preguntas - El examen físico es un buen momento para hacerle preguntas al médico o enfermero sobre sellers usman. Si es necesario, también puede derivarlo a otros médicos o especialistas.  Los adultos mayores de 65 años a menudo también necesitan otros cuidados. A medida que envejece, sellers médico hablará con usted sobre:   Cómo evitar las caídas en el hogar   Pruebas de audición o visión   Pruebas de memoria   Cómo sg maximus medicinas de manera melo   Asegurarse de tener la ayuda y el apoyo que necesita en casa  Todos los artículos se actualizan a medida que se descubre nueva evidencia y culmina nuestro proceso de evaluación por homólogos   [place] artículo se recuperó de UpToDate el: May 02, 2024.  Artículo 995645 Versión [id number].es-419.1  Release: 32.4.3 - C32.122  © 2024 [employer]Date, Inc. Todos los derechos reservados.  Exención de responsabilidad y uso de la información del consumidor   Descargo de responsabilidad: esta información generalizada es un resumen limitado de información sobre el diagnóstico, el tratamiento y/o los medicamentos. No pretende ser exhaustiva y se debe utilizar zachariah herramienta para ayudar al usuario a comprender y/o evaluar las posibles opciones de diagnóstico y tratamiento. No incluye toda la información sobre afecciones, tratamientos, medicamentos, efectos secundarios o riesgos puedan ser aplicables a un paciente específico. No tiene el propósito de servir zachariah recomendación médica ni de sustituir la recomendación médica, el diagnóstico o el tratamiento de un profesional de atención médica que se base en el examen y la evaluación de [place] profesional de la usman respecto a las circunstancias específicas y únicas del paciente. Los pacientes deben hablar con un profesional de atención médica para obtener información completa sobre sellers usman, cuestiones médicas y opciones de tratamiento, incluidos los riesgos o los beneficios relacionados con  el uso de medicamentos. Esta información no certifica que los tratamientos o medicamentos kaylie seguros, eficaces o estén aprobados para tratar a un paciente específico. [employer] y maximus afiliados renuncian a cualquier garantía o responsabilidad relacionada con esta información o el uso de la misma.El uso de esta información está sujeto a las Condiciones de uso, disponibles en https://www.[employer].com/en/know/clinical-effectiveness-terms. 2024© [employer] y maximus afiliados y/o licenciantes. Todos los derechos reservados.  Copyright   © 2024 [employer] Todos los derechos reservados.

## 2025-05-09 NOTE — TELEPHONE ENCOUNTER
[facility] Women's Health Surgical Case Review  Date Reviewed: 25   Reviewed by: Anna Marie Leal  Case request: D&C, hysteroscopy, possible polypectomy  Indication: AUB, possible polyp  Surgical Consent: 25: N/A     Case request approved: yes     Comments:  Must discuss IUD placement at time of surgery during H&P visit     Shavonne Thrasher MD  OBGYN PGY2  25  ----- Message -----  From: Sonja Pillai MD  Sent: 2025   4:38 PM EDT  To: Maribel Wall MA    [facility] OB GYN Department  Surgery Scheduling Sheet    Patient Name: Talia Shaw  : 1980    Provider: Sonja Pillai MD     Needed: yes; Language: Macanese     Procedure: exam under anesthesia, dilation and curettage , hysteroscopy, and polypectomy, and all other indicated procedures    Diagnosis: endometrial polyp    Special Needs or Equipment: symphion    Anesthesia: IV sedation with anesthesia    Length of stay: outpatient  Does patient have comorbid conditions that will require close perioperative monitoring prior to safe discharge: no    -The patient has comorbid conditions that will require close perioperative monitoring prior to safe discharge, including N/A.   -This may require acute care beyond the usual and routine recovery period. As such, inpatient admission post-operatively is expected and appropriate, and anticipated hospital length of stay will be >2 midnights.    Pre-Admission Testing Needed: no   Labs that should be ordered:urine pregnancy test    Order PAT that is recommended in prep for procedure?: No    Medical Clearance Needed: no; Provider: N/A    MA Form Signed (tubals/hysterectomy): Not Indicated    Surgical Drink Given: no     How many days out of work: 1 day(s)     How many days no drivin day(s)       Is pre op appt needed?  yes  Interval for post op appt: 2 week(s)       For Surgical Scheduler:     Surgery Scheduled On:  [place]:     Pre-op Appt:   Post op  Appt:  Consult/Medical clearance appt:N/A

## 2025-05-19 ENCOUNTER — APPOINTMENT (OUTPATIENT)
Dept: LAB | Facility: CLINIC | Age: 45
End: 2025-05-19

## 2025-05-19 DIAGNOSIS — Z13.9 SCREENING DUE: ICD-10-CM

## 2025-05-19 DIAGNOSIS — R10.32 LEFT LOWER QUADRANT ABDOMINAL PAIN: ICD-10-CM

## 2025-05-19 LAB
ALBUMIN SERPL BCG-MCNC: 4.3 G/DL (ref 3.5–5)
ALP SERPL-CCNC: 46 U/L (ref 34–104)
ALT SERPL W P-5'-P-CCNC: 22 U/L (ref 7–52)
ANION GAP SERPL CALCULATED.3IONS-SCNC: 9 MMOL/L (ref 4–13)
AST SERPL W P-5'-P-CCNC: 17 U/L (ref 13–39)
BASOPHILS # BLD AUTO: 0.03 THOUSANDS/ÂΜL (ref 0–0.1)
BASOPHILS NFR BLD AUTO: 1 % (ref 0–1)
BILIRUB SERPL-MCNC: 0.47 MG/DL (ref 0.2–1)
BUN SERPL-MCNC: 17 MG/DL (ref 5–25)
CALCIUM SERPL-MCNC: 9.1 MG/DL (ref 8.4–10.2)
CHLORIDE SERPL-SCNC: 105 MMOL/L (ref 96–108)
CHOLEST SERPL-MCNC: 156 MG/DL (ref ?–200)
CO2 SERPL-SCNC: 25 MMOL/L (ref 21–32)
CREAT SERPL-MCNC: 0.59 MG/DL (ref 0.6–1.3)
EOSINOPHIL # BLD AUTO: 0.27 THOUSAND/ÂΜL (ref 0–0.61)
EOSINOPHIL NFR BLD AUTO: 7 % (ref 0–6)
ERYTHROCYTE [DISTWIDTH] IN BLOOD BY AUTOMATED COUNT: 12.8 % (ref 11.6–15.1)
EST. AVERAGE GLUCOSE BLD GHB EST-MCNC: 117 MG/DL
GFR SERPL CREATININE-BSD FRML MDRD: 111 ML/MIN/1.73SQ M
GLUCOSE SERPL-MCNC: 100 MG/DL (ref 65–140)
HBA1C MFR BLD: 5.7 %
HCT VFR BLD AUTO: 38.2 % (ref 34.8–46.1)
HDLC SERPL-MCNC: 46 MG/DL
HGB BLD-MCNC: 12.5 G/DL (ref 11.5–15.4)
IMM GRANULOCYTES # BLD AUTO: 0 THOUSAND/UL (ref 0–0.2)
IMM GRANULOCYTES NFR BLD AUTO: 0 % (ref 0–2)
LDLC SERPL CALC-MCNC: 101 MG/DL (ref 0–100)
LYMPHOCYTES # BLD AUTO: 1.21 THOUSANDS/ÂΜL (ref 0.6–4.47)
LYMPHOCYTES NFR BLD AUTO: 30 % (ref 14–44)
MCH RBC QN AUTO: 29.1 PG (ref 26.8–34.3)
MCHC RBC AUTO-ENTMCNC: 32.7 G/DL (ref 31.4–37.4)
MCV RBC AUTO: 89 FL (ref 82–98)
MONOCYTES # BLD AUTO: 0.24 THOUSAND/ÂΜL (ref 0.17–1.22)
MONOCYTES NFR BLD AUTO: 6 % (ref 4–12)
NEUTROPHILS # BLD AUTO: 2.31 THOUSANDS/ÂΜL (ref 1.85–7.62)
NEUTS SEG NFR BLD AUTO: 56 % (ref 43–75)
NONHDLC SERPL-MCNC: 110 MG/DL
NRBC BLD AUTO-RTO: 0 /100 WBCS
PLATELET # BLD AUTO: 226 THOUSANDS/UL (ref 149–390)
PMV BLD AUTO: 11.6 FL (ref 8.9–12.7)
POTASSIUM SERPL-SCNC: 3.9 MMOL/L (ref 3.5–5.3)
PROT SERPL-MCNC: 7.4 G/DL (ref 6.4–8.4)
RBC # BLD AUTO: 4.3 MILLION/UL (ref 3.81–5.12)
SODIUM SERPL-SCNC: 139 MMOL/L (ref 135–147)
T4 FREE SERPL-MCNC: 0.83 NG/DL (ref 0.61–1.12)
TRIGL SERPL-MCNC: 45 MG/DL (ref ?–150)
TSH SERPL DL<=0.05 MIU/L-ACNC: 0.77 UIU/ML (ref 0.45–4.5)
WBC # BLD AUTO: 4.06 THOUSAND/UL (ref 4.31–10.16)

## 2025-05-19 PROCEDURE — 36415 COLL VENOUS BLD VENIPUNCTURE: CPT

## 2025-05-19 PROCEDURE — 85025 COMPLETE CBC W/AUTO DIFF WBC: CPT

## 2025-05-19 PROCEDURE — 80053 COMPREHEN METABOLIC PANEL: CPT

## 2025-05-19 PROCEDURE — 84439 ASSAY OF FREE THYROXINE: CPT

## 2025-05-19 PROCEDURE — 83036 HEMOGLOBIN GLYCOSYLATED A1C: CPT

## 2025-05-19 PROCEDURE — 80061 LIPID PANEL: CPT

## 2025-05-19 PROCEDURE — 84443 ASSAY THYROID STIM HORMONE: CPT

## 2025-05-22 ENCOUNTER — RESULTS FOLLOW-UP (OUTPATIENT)
Dept: GASTROENTEROLOGY | Facility: CLINIC | Age: 45
End: 2025-05-22

## 2025-05-22 DIAGNOSIS — R79.89 ABNORMAL CBC: Primary | ICD-10-CM

## 2025-07-17 ENCOUNTER — TELEPHONE (OUTPATIENT)
Dept: OBGYN CLINIC | Facility: CLINIC | Age: 45
End: 2025-07-17